# Patient Record
Sex: MALE | Race: WHITE
[De-identification: names, ages, dates, MRNs, and addresses within clinical notes are randomized per-mention and may not be internally consistent; named-entity substitution may affect disease eponyms.]

---

## 2017-11-20 NOTE — EDPHY
H & P


Stated Complaint: Lac to palm R hand;bleeding controlled


Time Seen by Provider: 11/20/17 15:32


HPI/ROS: 





CHIEF COMPLAINT:  Laceration





HISTORY OF PRESENT ILLNESS:  The patient is a 37-year-old man who comes to the 

emergency department complaining of laceration to the palm of his right wrist.  

He cut it accidentally using a .  The patient denies any other 

injuries.  He is not up-to-date on tetanus.








REVIEW OF SYSTEMS:


Constitutional:  denies: chills, fever, recent illness, recent injury


EENTM: denies: blurred vision, double vision, nose congestion


Respiratory: denies: cough, shortness of breath


Cardiac: denies: chest pain, irregular heart rate, lightheadedness, palpitations


Gastrointestinal/Abdominal: denies: abdominal pain, diarrhea, nausea, vomiting, 

blood streaked stools


Genitourinary: denies: dysuria, frequency, hematuria, pain


Musculoskeletal: denies: joint pain, muscle pain


Skin:  Laceration


Neurological: denies: headache, numbness, paresthesia, tingling, dizziness, 

weakness


Hematologic/Lymphatic: denies: blood clots, easy bleeding, easy bruising


Immunologic/allergic: denies: HIV/AIDS, transplant








EXAM:


GENERAL:  Well-appearing, well-nourished and in no acute distress.


HEAD:  Atraumatic, normocephalic.


EYES:  Pupils equal round and reactive to light, extraocular movements intact, 

sclera anicteric, conjunctiva are normal.


ENT:  TMs normal, nares patent, oropharynx clear without exudates.  Moist 

mucous membranes.


NECK:  Normal range of motion, supple without lymphadenopathy or JVD.


LUNGS:  Breath sounds clear to auscultation bilaterally and equal.  No wheezes 

rales or rhonchi.


HEART:  Regular rate and rhythm without murmurs, rubs or gallops.


ABDOMEN:  Soft, nontender, normoactive bowel sounds.  No guarding, no rebound.  

No masses appreciated.


BACK:  No CVA tenderness, no spinal tenderness, step-offs or deformities


EXTREMITIES:  Normal range of motion, no pitting or edema.  No clubbing or 

cyanosis.


NEUROLOGICAL:  Cranial nerves II through XII grossly intact.  Normal speech, 

normal gait.  5/5 strength, normal movement in all extremities, normal sensation


PSYCH:  Normal mood, normal affect.


SKIN:  3 cm laceration to right the palm of hand the normal range of motion.  

Normal sensation.  No difficulty with movement or strength.





Source: Patient


Exam Limitations: No limitations





- Personal History


Current Tetanus Diphtheria and Acellular Pertussis (TDAP): No





- Medical/Surgical History


Hx Asthma: No


Hx Chronic Respiratory Disease: No


Hx Diabetes: No


Hx Cardiac Disease: No


Hx Renal Disease: No


Hx Cirrhosis: No


Hx Alcoholism: No


Other PMH: neg





- Family History


Significant Family History: No pertinent family hx





- Social History


Smoking Status: Former smoker


Alcohol Use: Sober


Drug Use: None


Constitutional: 


 Initial Vital Signs











Temperature (C)  36.7 C   11/20/17 15:11


 


Heart Rate  75   11/20/17 15:11


 


Respiratory Rate  18   11/20/17 15:11


 


Blood Pressure  106/63   11/20/17 15:11


 


O2 Sat (%)  977 H  11/20/17 15:11








 











O2 Delivery Mode               Room Air














Allergies/Adverse Reactions: 


 





cefaclor [From Ceclor] Allergy (Unknown, Verified 11/20/17 15:11)


 








Home Medications: 














 Medication  Instructions  Recorded


 


NK [No Known Home Meds]  11/20/17














Medical Decision Making


Procedures: 





Procedure:  Laceration repair.





Verbal consent was obtained from the patient.  The 3 cm hand laceration was 

anesthetized with 0.5% bupivacaine locally infiltrated.  The wound was 

irrigated copiously according to protocol, draped and explored to its base.  It 

was approximately 1/2 cm deep.  There were no deep structures involved.  No 

tendon, nerve, or vascular injury was identified when explored through full 

range of motion.  No foreign body was identified.  The wound was repaired with 

5.0 Prolene, 8 sutures, continuous.  The wound repair was simple without wound 

margin revisement or multiple flap alignment.  The procedure was performed by 

myself.  A dressing was then placed with sterile gauze and bacitracin.


ED Course/Re-evaluation: 





Patient tolerated the procedure well.  He is received his tetanus vaccine.  He 

declines further workup or testing.  We discussed suture care and removal. 


Differential Diagnosis: 





Partial list of the Differential diagnosis considered include but were not 

limited to;  hand laceration and although unlikely based on the history and 

physical exam, I also considered foreign body, tendon injury, vascular injury, 

nerve injury.  I discussed these differential diagnoses and the plan with the 

patient as well as the usual and expected course.  The patient understands that 

the diagnosis is provisional and that in medicine we are not always correct and 

that further workup is often warranted.  Usual and customary warnings were 

given.  All of the patient's questions were answered.  The patient was 

instructed to return to the emergency department should the symptoms at all 

worsen or return, otherwise to followup with the physician as we discussed.





- Data Points


Medications Given: 


 








Discontinued Medications





Diphtheria/Tetanus/Acell Pertussis (Boostrix)  0.5 ml IM .ONCE ONE


   Stop: 11/20/17 15:39


   Last Admin: 11/20/17 15:55 Dose:  0.5 ml








Departure





- Departure


Disposition: Home, Routine, Self-Care


Clinical Impression: 


 Laceration





Condition: Fair


Instructions:  Care For Your Stitches (ED), Laceration (ED)


Additional Instructions: 


Have your sutures removed in 10 days


Referrals: 


NONE *PRIMARY CARE P,. [Primary Care Provider] - As per Instructions

## 2019-04-11 ENCOUNTER — HOSPITAL ENCOUNTER (EMERGENCY)
Dept: HOSPITAL 80 - FED | Age: 39
Discharge: HOME | End: 2019-04-11
Payer: COMMERCIAL

## 2019-04-11 VITALS — DIASTOLIC BLOOD PRESSURE: 61 MMHG | SYSTOLIC BLOOD PRESSURE: 116 MMHG

## 2019-04-11 DIAGNOSIS — S51.812A: Primary | ICD-10-CM
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
## 2019-04-11 PROCEDURE — 0HQEXZZ REPAIR LEFT LOWER ARM SKIN, EXTERNAL APPROACH: ICD-10-PCS

## 2019-04-11 NOTE — EDPHY
H & P


Time Seen by Provider: 04/11/19 15:25


HPI/ROS: 


HPI:  This is a 39-year-old male who presents with





Chief Complaint:  Left forearm laceration





Location:  Left forearm


Quality:  Laceration


Duration:  Prior to arrival


Signs and Symptoms:  + bleeding, no radiation, no numbness, no weakness, no 

tingling, no incontinence, no decreased range of motion, no swelling, no pain, 

no fever


Timing:  Acute


Severity:  Moderate


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 

his employer, when he accidentally slipped and was cut by glass on his left 

forearm.  He reports that he immediately started to bleed and he applied direct 

pressure.  He reports that the bleeding will not stop.  Denies radiation, 

weakness, decreased range of motion, paresthesias.  Reports last tetanus 

booster given 2 years ago.  He denies any pain at this time.


Modifying Factors:  Direct pressure





Comment: 








ROS:  A comprehensive 10 system review of systems is otherwise negative aside 

from elements mentioned in the history of present illness. 








MEDICAL/SURGICAL/SOCIAL HISTORY: 


Medical history:  Generally healthy.  Does not take any regular medications.


Surgical history:  Denies


Social history:  Employed.  Has children.  Former smoker.








CONSTITUTIONAL:  Polite and cooperative adult white male, awake and alert, no 

obvious distress


HEENT: Atraumatic and normocephalic, PERRL, EOMI.  Nares patent; no rhinorrhea;

  no nasal mucosal edema. Tympanic membranes clear. Oropharynx clear, no 

exudate and moist pink mucosa.  Airway patent.  No lymphadenopathy.  No 

meningismus.


Cardiovascular: Normal S1/S2, regular rate, regular rhythm, without murmur rub 

or gallop.


PULMONARY/CHEST:  Symmetrical and nontender. Clear to auscultation bilaterally. 

Good air movement. No accessory muscle usage.


ABDOMEN:  Soft, nondistended, nontender, no rebound, no guarding, no peritoneal 

signs, no masses or organomegaly. No CVAT.


EXTREMITIES:  2/2 pulses, strength 5/5, left WRIST:  Extension to 70, flexion 

to 80, radial deviation to 20 degree, ulnar deviation to 30, no scaphoid 

tenderness, no tenderness over ulnar styloid, no tenderness over radial 

styloid.  Volar aspect of left forearm shows 7 cm, simple, deep laceration 

running vertically with scant amount of active bleeding from superficial 

capillary injury.  no clubbing, no cyanosis or edema.


NEUROLOGICAL: no focal neuro deficits.  GCS 15.


SKIN: Warm and dry, no erythema.  Multiple tattoos covering body.  no rash.  

Good capillary refill. 


  





Source: Patient


Exam Limitations: No limitations





- Personal History


Current Tetanus Diphtheria and Acellular Pertussis (TDAP): Yes





- Medical/Surgical History


Hx Asthma: No


Hx Chronic Respiratory Disease: No


Hx Diabetes: No


Hx Cardiac Disease: No


Hx Renal Disease: No


Hx Cirrhosis: No


Hx Alcoholism: No


Other PMH: neg





- Social History


Smoking Status: Former smoker


Constitutional: 


 Initial Vital Signs











Heart Rate  59 L  04/11/19 15:27


 


Respiratory Rate  16   04/11/19 15:27


 


Blood Pressure  116/61   04/11/19 15:27


 


O2 Sat (%)  100   04/11/19 15:27








 











O2 Delivery Mode               Room Air














Allergies/Adverse Reactions: 


 





cefaclor [From Ceclor] Allergy (Unknown, Verified 11/20/17 15:11)


 








Home Medications: 














 Medication  Instructions  Recorded


 


NK [No Known Home Meds]  11/20/17














Medical Decision Making


Procedures: 


Procedure:  Laceration repair.





Verbal consent was obtained from the patient.  The 7 cm, simple, deep, vertical 

laceration on the volar aspect of the left forearm was anesthetized in the 

usual fashion using 14 mL of 1% lidocaine with epinephrine.  The wound was 

irrigated, draped and explored to its base with a gloved finger.  There were no 

deep structures involved.  No tendon injury was identified.  The wound was 

repaired with #7, 4-0 Prolene simple interrupted pattern.  Good hemostasis was 

achieved and patient tolerated procedure well.  Xeroform and clean sterile 

dressing applied.  The procedure was performed by myself.





ED Course/Re-evaluation: 


Tetanus is up-to-date.


Local anesthesia provided and copiously irrigated


Laceration closed with 7 nonabsorbable sutures


Xeroform and cleared sterile dressing applied


Verbal and written wound care instructions provided


It does not appear patient has nerve or ligament injury at this time.


Referral to orthopedic provided as needed








No signs of neurovascular compromise/tenting of skin/compartment syndrome/

extremities and joints examined above and below area of concern and are 

neurovascularly intact.








This patient was seen under the supervision of my primary supervising 

physician.  I evaluated care for this patient independently. 





Differential Diagnosis: 


Differential diagnosis includes but is not limited to laceration, nerve injury, 

ligament injury, capillary injury.








Departure





- Departure


Disposition: Home, Routine, Self-Care


Clinical Impression: 


Laceration of left forearm without complication


Qualifiers:


 Encounter type: initial encounter Qualified Code(s): S51.812A - Laceration 

without foreign body of left forearm, initial encounter





Condition: Good


Instructions:  Laceration (ED), Care For Your Stitches (ED)


Additional Instructions: 


Keep the dressing dry and in place for 48 hours.


After 48 hours, you may remove the dressing; wash the site daily with mild soap 

and water; then pat dry. 


Do not soak in a bathtub or go swimming until sutures are removed.


Take Tylenol 650 mg every 4 hours and/or Ibuprofen 600 mg every 8 hours with 

food as needed for pain. 


You experience weakness, decreased range of motion or numbness; follow-up with 

Orthopedics for further evaluation.











Wound Care Follow-Up:


Removal of sutures in [ 10-14 ] days.  Suture removal is complimentary in 

uncomplicated cases.  


Infection or abnormal findings would require reevaluation by the MD.  In that 

case, you may be billed.  


Referrals: 


Samir Howe MD [Medical Doctor] - As per Instructions